# Patient Record
(demographics unavailable — no encounter records)

---
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
## 2024-11-27 NOTE — IMAGING
[de-identified] : MRI of right knee ([facility]) 2/2/2023 IMPRESSION: 1. Horizontal tear involving the posterior horn of the medial meniscus. 2. Probable horizontal tear involving the anterior horn of the lateral meniscus. 3. Synovial proliferation and cyst formation consistent with chronic synovitis extending anterior to the distal ACL and anterior to the lateral compartment. 4. Moderate effusion. 5. 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

## 2024-11-27 NOTE — DISCUSSION/SUMMARY
[de-identified] : Elise has moderate knee osteoarthritis on her x-rays.  She has an MRI from February 2023 which shows meniscus tear and mild OA at that point.  It does appear that is progressed particularly in the patellofemoral joint since her prior visit.  I would like to repeat her MRI to further evaluate for cartilage loss I will see her back after the MRI is completed.  She has patellofemoral crepitus and an effusion.  We discussed possible gel injections pending the results of the MRI.  All of her questions were answered she is in agreement with this plan.

## 2025-03-31 NOTE — DISCUSSION/SUMMARY
[de-identified] : Reordered MRI to evaluate PF cartilage Necessary to form treatment plan of cont PT vs gel injections/CSI vs cartilage restoration procedure PDA after MRI All questions answered, agreeable with plan

## 2025-03-31 NOTE — IMAGING
[de-identified] : MRI of right knee ([facility]) 2/2/2023 IMPRESSION: 1. Horizontal tear involving the posterior horn of the medial meniscus. 2. Probable horizontal tear involving the anterior horn of the lateral meniscus. 3. Synovial proliferation and cyst formation consistent with chronic synovitis extending anterior to the distal ACL and anterior to the lateral compartment. 4. Moderate effusion. 5. 

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
## 2025-05-21 NOTE — DISCUSSION/SUMMARY
[de-identified] : Reordered MRI to evaluate PF cartilage Necessary to form treatment plan of gel injections/CSI vs cartilage restoration procedure PDA after MRI All questions answered, agreeable with plan
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
## 2025-05-21 NOTE — IMAGING
[de-identified] : Gait: Mildly antalgic Alignment: neutral Scars: none    R Knee: Tenderness: medial/lateral PF joint line ROM: 5-100 Crepitus: Present - PF joint Effusion: Present  Warmth: mild   Ligament Exam:  Lachman: neg  Post Drawer: neg  Valgus stress: neg at 0 and 30 degrees Varus stress: neg at 0 and 30 degrees Pivot shift: neg Dial test: neg at 30 degrees, neg at 90 degrees     Strength: Quads: 5/5 Hamstrin/5 DF/PF/EHL 5/5   Sensation grossly intact in all dermatomes, DP/PT 2+, brisk capillary refill distally

## 2025-05-21 NOTE — WORK
[Other: ___] : [unfilled] [Was the competent medical cause of the injury] : was the competent medical cause of the injury [Are consistent with the injury] : are consistent with the injury [Consistent with my objective findings] : consistent with my objective findings [Partial] : partial [No Rx restrictions] : No Rx restrictions. [I provided the services listed above] :  I provided the services listed above. [FreeTextEntry1] : good